# Patient Record
Sex: MALE | Employment: UNEMPLOYED | URBAN - METROPOLITAN AREA
[De-identification: names, ages, dates, MRNs, and addresses within clinical notes are randomized per-mention and may not be internally consistent; named-entity substitution may affect disease eponyms.]

---

## 2024-02-16 ENCOUNTER — APPOINTMENT (EMERGENCY)
Dept: RADIOLOGY | Facility: HOSPITAL | Age: 24
End: 2024-02-16

## 2024-02-16 ENCOUNTER — HOSPITAL ENCOUNTER (EMERGENCY)
Facility: HOSPITAL | Age: 24
Discharge: HOME | End: 2024-02-16
Attending: EMERGENCY MEDICINE

## 2024-02-16 VITALS
HEART RATE: 95 BPM | SYSTOLIC BLOOD PRESSURE: 139 MMHG | RESPIRATION RATE: 18 BRPM | TEMPERATURE: 97.7 F | DIASTOLIC BLOOD PRESSURE: 85 MMHG | WEIGHT: 153.88 LBS | OXYGEN SATURATION: 99 %

## 2024-02-16 DIAGNOSIS — S43.014A ANTERIOR DISLOCATION OF RIGHT SHOULDER, INITIAL ENCOUNTER: Primary | ICD-10-CM

## 2024-02-16 PROCEDURE — 99283 EMERGENCY DEPT VISIT LOW MDM: CPT

## 2024-02-16 PROCEDURE — 73020 X-RAY EXAM OF SHOULDER: CPT

## 2024-02-16 PROCEDURE — 73000 X-RAY EXAM OF COLLAR BONE: CPT

## 2024-02-16 PROCEDURE — 23650 CLTX SHO DSLC W/MNPJ WO ANES: CPT

## 2024-02-16 PROCEDURE — 73030 X-RAY EXAM OF SHOULDER: CPT

## 2024-02-16 PROCEDURE — 99285 EMERGENCY DEPT VISIT HI MDM: CPT

## 2024-02-16 RX ORDER — IBUPROFEN 400 MG/1
800 TABLET ORAL ONCE
Status: COMPLETED | OUTPATIENT
Start: 2024-02-16 | End: 2024-02-16

## 2024-02-16 RX ORDER — LIDOCAINE HYDROCHLORIDE 10 MG/ML
10 INJECTION, SOLUTION EPIDURAL; INFILTRATION; INTRACAUDAL; PERINEURAL ONCE
Status: COMPLETED | OUTPATIENT
Start: 2024-02-16 | End: 2024-02-16

## 2024-02-16 RX ADMIN — IBUPROFEN 800 MG: 400 TABLET ORAL at 14:37

## 2024-02-16 RX ADMIN — LIDOCAINE HYDROCHLORIDE 10 ML: 10 INJECTION, SOLUTION EPIDURAL; INFILTRATION; INTRACAUDAL; PERINEURAL at 15:05

## 2024-02-16 NOTE — ED PROVIDER NOTES
History  Chief Complaint   Patient presents with    Shoulder Injury     Pt states he slipped on ice and fell on his R shoulder. Pt denies hitting his head and LOC.     Patient is a 23-year-old male coming in for evaluation of right shoulder pain after he slipped on ice, landed on his right arm in a snow bank.  Is in no acute distress at this time.  Denies head strike, denies loss consciousness.  Patient is holding his arm in internal rotation and extended.  Willing to move.  1+ radial pulse on the right side.  Hand is cold, however patient did land in a snow bank, and is wearing multiple layers as it is 30 degrees out      Shoulder Injury  Location:  Shoulder  Shoulder location:  R shoulder  Injury: yes    Mechanism of injury: fall        None       History reviewed. No pertinent past medical history.    History reviewed. No pertinent surgical history.    History reviewed. No pertinent family history.  I have reviewed and agree with the history as documented.    E-Cigarette/Vaping    E-Cigarette Use Never User      E-Cigarette/Vaping Substances    Nicotine No     THC No     CBD No     Flavoring No     Other No     Unknown No      Social History     Tobacco Use    Smoking status: Every Day     Current packs/day: 1.00     Types: Cigarettes    Smokeless tobacco: Never   Vaping Use    Vaping status: Never Used   Substance Use Topics    Alcohol use: Yes     Comment: occasional    Drug use: Never       Review of Systems   Musculoskeletal:  Positive for arthralgias and joint swelling.       Physical Exam  Physical Exam  Vitals reviewed.   Constitutional:       Appearance: Normal appearance. He is normal weight.   HENT:      Head: Normocephalic and atraumatic.      Right Ear: External ear normal.      Left Ear: External ear normal.      Nose: Nose normal.   Eyes:      Conjunctiva/sclera: Conjunctivae normal.   Cardiovascular:      Rate and Rhythm: Normal rate.   Pulmonary:      Effort: Pulmonary effort is normal.    Musculoskeletal:         General: Deformity present. Normal range of motion.      Cervical back: Normal range of motion.      Comments: No osseous tenderness of the right clavicle.  No osseous instability or crepitus.  There is a slight deformity noticed on right shoulder, however patient would not move his arm closer to his chest.  1+ radial pulse on the right side.  Patient is able to move fingers appropriate   Skin:     General: Skin is warm and dry.   Neurological:      Mental Status: He is alert.         Vital Signs  ED Triage Vitals   Temperature Pulse Respirations Blood Pressure SpO2   02/16/24 1349 02/16/24 1354 02/16/24 1354 02/16/24 1354 02/16/24 1354   97.7 °F (36.5 °C) 94 18 155/98 100 %      Temp Source Heart Rate Source Patient Position - Orthostatic VS BP Location FiO2 (%)   02/16/24 1349 02/16/24 1354 02/16/24 1354 02/16/24 1354 --   Oral Monitor Sitting Left arm       Pain Score       02/16/24 1437       10 - Worst Possible Pain           Vitals:    02/16/24 1354 02/16/24 1527   BP: 155/98 139/85   Pulse: 94 95   Patient Position - Orthostatic VS: Sitting Sitting         Visual Acuity      ED Medications  Medications   ibuprofen (MOTRIN) tablet 800 mg (800 mg Oral Given 2/16/24 1437)   lidocaine (PF) (XYLOCAINE-MPF) 1 % injection 10 mL (10 mL Infiltration Given by Other 2/16/24 1505)       Diagnostic Studies  Results Reviewed       None                   XR shoulder 1 vw RIGHT POST REDUCTION   ED Interpretation by Kiko Venegas PA-C (02/16 1519)   Shoulder reduced      XR shoulder 2+ views RIGHT   ED Interpretation by Kiko Venegas PA-C (02/16 1452)   Anterior dislocation      XR clavicle RIGHT   ED Interpretation by Kiko Venegas PA-C (02/16 1519)   Potential slight AC separation                  Procedures  Orthopedic injury treatment    Date/Time: 2/16/2024 3:39 PM    Performed by: Kiko Venegas PA-C  Authorized by: Kiko Venegas PA-C    Patient Location:  ED  [facility]  Protocol:  Patient identity confirmed: verbally with patient    Injury location:  Shoulder  Location details:  Right shoulder  Injury type:  Dislocation  Dislocation type: anterior    Chronicity:  New  Local anesthesia used?: Yes    Local anesthetic: Intraarticular shoulder.  Manipulation performed?: Yes    Reduction method:  External rotation  Reduction method:  External rotation  Reduction method:  External rotation  Reduction method:  External rotation  Reduction method:  External rotation  Reduction method:  External rotation  Skeletal traction used?: Yes    Reduction successful?: Yes    Confirmation: Reduction confirmed by x-ray    Immobilization:  Sling  Neurovascular status: Neurovascularly intact    Distal perfusion: normal    Neurological function: normal    Range of motion: normal    Patient tolerance:  Patient tolerated the procedure well with no immediate complications           ED Course                               SBIRT 20yo+      Flowsheet Row Most Recent Value   Initial Alcohol Screen: US AUDIT-C     1. How often do you have a drink containing alcohol? 0 Filed at: 02/16/2024 1352   2. How many drinks containing alcohol do you have on a typical day you are drinking?  0 Filed at: 02/16/2024 1352   3a. Male UNDER 65: How often do you have five or more drinks on one occasion? 0 Filed at: 02/16/2024 1352   3b. FEMALE Any Age, or MALE 65+: How often do you have 4 or more drinks on one occassion? 0 Filed at: 02/16/2024 1352   Audit-C Score 0 Filed at: 02/16/2024 1352   KEHINDE: How many times in the past year have you...    Used an illegal drug or used a prescription medication for non-medical reasons? Never Filed at: 02/16/2024 1352                      Medical Decision Making  Patient is a 23-year-old male who comes in for evaluation of right shoulder injury.  Was reduced to emergency department.  Patient pain went away after the reduction, as well as the lidocaine kicking in.  Patient was discharged home  with follow-up with orthopedics    Amount and/or Complexity of Data Reviewed  Radiology: ordered and independent interpretation performed.    Risk  Prescription drug management.             Disposition  Final diagnoses:   Anterior dislocation of right shoulder, initial encounter     Time reflects when diagnosis was documented in both MDM as applicable and the Disposition within this note       Time User Action Codes Description Comment    2/16/2024  3:20 PM Kiko Venegas Add [S43.014A] Anterior dislocation of right shoulder, initial encounter           ED Disposition       ED Disposition   Discharge    Condition   Stable    Date/Time   Fri Feb 16, 2024 1520    Comment   Lima Vizcaino discharge to home/self care.                   Follow-up Information       Follow up With Specialties Details Why Contact Info Additional Information    [facility] Emergency Department Emergency Medicine  As needed, If symptoms worsen [address] [facility] [address] [phone] Critical access hospital Emergency Department            There are no discharge medications for this patient.          PDMP Review       None            ED Provider  Electronically Signed by             Kiko Venegas PA-C  02/16/24 7066

## 2024-02-16 NOTE — Clinical Note
Lima Vizcaino was seen and treated in our emergency department on 2/16/2024.    No restrictions            Diagnosis:     Lima  .    He may return on this date: 02/19/2024         If you have any questions or concerns, please don't hesitate to call.      Kiko Venegas PA-C    ______________________________           _______________          _______________  Hospital Representative                              Date                                Time

## 2025-03-26 ENCOUNTER — OFFICE VISIT (OUTPATIENT)
Dept: DENTISTRY | Facility: CLINIC | Age: 25
End: 2025-03-26

## 2025-03-26 VITALS — HEART RATE: 80 BPM | DIASTOLIC BLOOD PRESSURE: 87 MMHG | SYSTOLIC BLOOD PRESSURE: 132 MMHG | TEMPERATURE: 100 F

## 2025-03-26 DIAGNOSIS — Z01.21 ENCOUNTER FOR DENTAL EXAMINATION AND CLEANING WITH ABNORMAL FINDINGS: Primary | ICD-10-CM

## 2025-03-26 PROCEDURE — D0330 PANORAMIC RADIOGRAPHIC IMAGE: HCPCS | Performed by: DENTIST

## 2025-03-26 PROCEDURE — D0140 LIMITED ORAL EVALUATION - PROBLEM FOCUSED: HCPCS | Performed by: DENTIST

## 2025-03-26 RX ORDER — AMOXICILLIN 500 MG/1
500 CAPSULE ORAL EVERY 8 HOURS SCHEDULED
Qty: 21 CAPSULE | Refills: 0 | Status: SHIPPED | OUTPATIENT
Start: 2025-03-26 | End: 2025-04-02

## 2025-03-26 NOTE — PROGRESS NOTES
Pt. Presented as an emergency complaining from swelling at the left side of his face for the last two days, with moderate pain all day long.    Reviewing [facility] , No allergy to penicillin    ASA : I  Pain level : 5    Clinical exam revealing a moderate swelling of the left cheek and the soft tissue around tooth# 18.  Tooth 3 18 has a large occlusal diane , also teeth # 16,17,19 1, have different sizes carries.    Panoramic x-ray done due to the limited mouth opening , revealing a large diane encroaching the pulp chamber at # 18,     Problem explained to pt. Who requested to save # 18 if possible ( RCT needed by specialist )     Rx : Amoxicillin 500 mg caps for 7 days w/o refills  sent to pt's pharmacy of choice    Post op instructions given    NV : pt will come back after a week to re evaluate # 18 and getting an endodontic referral .  Also pt. Will benefit from comp.exam and tx plan that may include fillings and referral to [facility] for # 1,16,17 extractions.    Pt. With the help of the [place] speaking dental assistant understood and left satisfied.